# Patient Record
Sex: FEMALE | URBAN - METROPOLITAN AREA
[De-identification: names, ages, dates, MRNs, and addresses within clinical notes are randomized per-mention and may not be internally consistent; named-entity substitution may affect disease eponyms.]

---

## 2022-11-22 ENCOUNTER — HOSPITAL ENCOUNTER (OUTPATIENT)
Dept: LAB | Age: 22
Discharge: HOME | End: 2022-11-22
Payer: OTHER GOVERNMENT

## 2022-11-22 ENCOUNTER — OFFICE VISIT (OUTPATIENT)
Dept: FAMILY MEDICINE CLINIC | Age: 22
End: 2022-11-22
Payer: OTHER GOVERNMENT

## 2022-11-22 VITALS
TEMPERATURE: 98.2 F | BODY MASS INDEX: 26.98 KG/M2 | HEIGHT: 64 IN | HEART RATE: 78 BPM | OXYGEN SATURATION: 98 % | DIASTOLIC BLOOD PRESSURE: 62 MMHG | RESPIRATION RATE: 15 BRPM | SYSTOLIC BLOOD PRESSURE: 123 MMHG | WEIGHT: 158 LBS

## 2022-11-22 DIAGNOSIS — F32.A ANXIETY AND DEPRESSION: ICD-10-CM

## 2022-11-22 DIAGNOSIS — Z30.8 ENCOUNTER FOR OTHER CONTRACEPTIVE MANAGEMENT: ICD-10-CM

## 2022-11-22 DIAGNOSIS — F41.9 ANXIETY AND DEPRESSION: ICD-10-CM

## 2022-11-22 DIAGNOSIS — G43.009 MIGRAINE WITHOUT AURA AND WITHOUT STATUS MIGRAINOSUS, NOT INTRACTABLE: ICD-10-CM

## 2022-11-22 DIAGNOSIS — G43.009 MIGRAINE WITHOUT AURA AND WITHOUT STATUS MIGRAINOSUS, NOT INTRACTABLE: Primary | ICD-10-CM

## 2022-11-22 DIAGNOSIS — K30 DELAYED GASTRIC EMPTYING: ICD-10-CM

## 2022-11-22 PROBLEM — K27.9 PEPTIC ULCER: Status: ACTIVE | Noted: 2022-11-22

## 2022-11-22 LAB
ANION GAP SERPL CALC-SCNC: 5 MMOL/L (ref 3–18)
APPEARANCE UR: CLEAR
BASOPHILS # BLD: 0 K/UL (ref 0–0.1)
BASOPHILS NFR BLD: 1 % (ref 0–2)
BILIRUB UR QL: NEGATIVE
BUN SERPL-MCNC: 11 MG/DL (ref 7–18)
BUN/CREAT SERPL: 15 (ref 12–20)
CALCIUM SERPL-MCNC: 8.9 MG/DL (ref 8.5–10.1)
CHLORIDE SERPL-SCNC: 107 MMOL/L (ref 100–111)
CHOLEST SERPL-MCNC: 203 MG/DL
CO2 SERPL-SCNC: 26 MMOL/L (ref 21–32)
COLOR UR: YELLOW
CREAT SERPL-MCNC: 0.74 MG/DL (ref 0.6–1.3)
CREAT UR-MCNC: 278 MG/DL (ref 30–125)
DIFFERENTIAL METHOD BLD: ABNORMAL
EOSINOPHIL # BLD: 0.1 K/UL (ref 0–0.4)
EOSINOPHIL NFR BLD: 1 % (ref 0–5)
ERYTHROCYTE [DISTWIDTH] IN BLOOD BY AUTOMATED COUNT: 13 % (ref 11.6–14.5)
EST. AVERAGE GLUCOSE BLD GHB EST-MCNC: 111 MG/DL
GLUCOSE SERPL-MCNC: 90 MG/DL (ref 74–99)
GLUCOSE UR STRIP.AUTO-MCNC: NEGATIVE MG/DL
HBA1C MFR BLD: 5.5 % (ref 4.2–5.6)
HCT VFR BLD AUTO: 36.9 % (ref 35–45)
HDLC SERPL-MCNC: 46 MG/DL (ref 40–60)
HDLC SERPL: 4.4 {RATIO} (ref 0–5)
HGB BLD-MCNC: 11.8 G/DL (ref 12–16)
HGB UR QL STRIP: NEGATIVE
IMM GRANULOCYTES # BLD AUTO: 0 K/UL (ref 0–0.04)
IMM GRANULOCYTES NFR BLD AUTO: 0 % (ref 0–0.5)
KETONES UR QL STRIP.AUTO: NEGATIVE MG/DL
LDLC SERPL CALC-MCNC: 133.6 MG/DL (ref 0–100)
LEUKOCYTE ESTERASE UR QL STRIP.AUTO: NEGATIVE
LIPID PROFILE,FLP: ABNORMAL
LYMPHOCYTES # BLD: 1.6 K/UL (ref 0.9–3.6)
LYMPHOCYTES NFR BLD: 29 % (ref 21–52)
MCH RBC QN AUTO: 26.9 PG (ref 24–34)
MCHC RBC AUTO-ENTMCNC: 32 G/DL (ref 31–37)
MCV RBC AUTO: 84.1 FL (ref 78–100)
MICROALBUMIN UR-MCNC: 1.76 MG/DL (ref 0–3)
MICROALBUMIN/CREAT UR-RTO: 6 MG/G (ref 0–30)
MONOCYTES # BLD: 0.3 K/UL (ref 0.05–1.2)
MONOCYTES NFR BLD: 6 % (ref 3–10)
NEUTS SEG # BLD: 3.6 K/UL (ref 1.8–8)
NEUTS SEG NFR BLD: 63 % (ref 40–73)
NITRITE UR QL STRIP.AUTO: NEGATIVE
NRBC # BLD: 0 K/UL (ref 0–0.01)
NRBC BLD-RTO: 0 PER 100 WBC
PH UR STRIP: 5.5 [PH] (ref 5–8)
PLATELET # BLD AUTO: 224 K/UL (ref 135–420)
PMV BLD AUTO: 10.3 FL (ref 9.2–11.8)
POTASSIUM SERPL-SCNC: 4.2 MMOL/L (ref 3.5–5.5)
PROT UR STRIP-MCNC: NEGATIVE MG/DL
RBC # BLD AUTO: 4.39 M/UL (ref 4.2–5.3)
SODIUM SERPL-SCNC: 138 MMOL/L (ref 136–145)
SP GR UR REFRACTOMETRY: 1.03 (ref 1–1.03)
TRIGL SERPL-MCNC: 117 MG/DL (ref ?–150)
TSH SERPL DL<=0.05 MIU/L-ACNC: 2.5 UIU/ML (ref 0.36–3.74)
UROBILINOGEN UR QL STRIP.AUTO: 0.2 EU/DL (ref 0.2–1)
VLDLC SERPL CALC-MCNC: 23.4 MG/DL
WBC # BLD AUTO: 5.6 K/UL (ref 4.6–13.2)

## 2022-11-22 PROCEDURE — 82043 UR ALBUMIN QUANTITATIVE: CPT

## 2022-11-22 PROCEDURE — 99204 OFFICE O/P NEW MOD 45 MIN: CPT

## 2022-11-22 PROCEDURE — 80061 LIPID PANEL: CPT

## 2022-11-22 PROCEDURE — 84443 ASSAY THYROID STIM HORMONE: CPT

## 2022-11-22 PROCEDURE — 86803 HEPATITIS C AB TEST: CPT

## 2022-11-22 PROCEDURE — 80048 BASIC METABOLIC PNL TOTAL CA: CPT

## 2022-11-22 PROCEDURE — 36415 COLL VENOUS BLD VENIPUNCTURE: CPT

## 2022-11-22 PROCEDURE — 85025 COMPLETE CBC W/AUTO DIFF WBC: CPT

## 2022-11-22 PROCEDURE — 83036 HEMOGLOBIN GLYCOSYLATED A1C: CPT

## 2022-11-22 PROCEDURE — 81003 URINALYSIS AUTO W/O SCOPE: CPT

## 2022-11-22 RX ORDER — ACETAMINOPHEN AND CODEINE PHOSPHATE 120; 12 MG/5ML; MG/5ML
1 SOLUTION ORAL DAILY
Qty: 30 TABLET | Refills: 4 | Status: SHIPPED | OUTPATIENT
Start: 2022-11-22

## 2022-11-22 RX ORDER — PROMETHAZINE HYDROCHLORIDE 12.5 MG/1
12.5 TABLET ORAL
Qty: 30 TABLET | Refills: 3 | Status: SHIPPED | OUTPATIENT
Start: 2022-11-22

## 2022-11-22 RX ORDER — CYCLOBENZAPRINE HCL 5 MG
5 TABLET ORAL 2 TIMES DAILY
Qty: 60 TABLET | Refills: 3 | Status: SHIPPED | OUTPATIENT
Start: 2022-11-22

## 2022-11-22 RX ORDER — NORETHINDRONE ACETATE AND ETHINYL ESTRADIOL, AND FERROUS FUMARATE 1MG-20(24)
KIT ORAL
COMMUNITY
End: 2022-11-22

## 2022-11-22 RX ORDER — FLUOXETINE 10 MG/1
10 TABLET ORAL DAILY
COMMUNITY
End: 2022-11-22 | Stop reason: SDUPTHER

## 2022-11-22 RX ORDER — FLUOXETINE 10 MG/1
10 TABLET ORAL DAILY
Qty: 90 TABLET | Refills: 1 | Status: SHIPPED | OUTPATIENT
Start: 2022-11-22

## 2022-11-22 RX ORDER — ONDANSETRON 4 MG/1
TABLET, ORALLY DISINTEGRATING ORAL
COMMUNITY

## 2022-11-22 RX ORDER — SUMATRIPTAN 50 MG/1
50 TABLET, FILM COATED ORAL
Qty: 30 TABLET | Refills: 3 | Status: SHIPPED | OUTPATIENT
Start: 2022-11-22

## 2022-11-22 NOTE — PROGRESS NOTES
Oma Clancy (: 2000) is a 23 y.o. female, new patient, here for evaluation of the following chief complaint(s):  No chief complaint on file. Subjective:     HPI:    Patient presents to [facility] care. She reports that she has been suffering from migraines for about 3 years without treatment. She reports that headaches start low and increase in intensity. Headaches are usually one-sided and last about 24 hours. She denies nausea or vomiting during episodes. She reports sensitivity to light, denies loss of vision, denies known aura  She reports that stress is a trigger for migraines. She reports headaches are usually precipitated by pain and tension in shoulders and neck prior to headache. She characterizes headache as throbbing. She states that over-the-counter medications do not work and she has been treating headaches by alternating ice and heat. Frequency of headaches have been every day for the last 2 weeks. She reports increased stress over the last few weeks that she is just moved to [place] from [place]. She also reports that she has been taking Prozac for anxiety since last December. Patient was educated that Prozac and pregnancy. patient reports that she is on birth control. She also presents with concern about constipation. Past medical history includes delayed gastric emptying. She reports intermittent nausea. Last endoscopy was in 2019 and was negative. She reports stools are brown and has bowel movement every 2 to 3 days. No past medical history on file. No past surgical history on file.     ROS:    General: negative for - chills, fever, weight changes or malaise  HEENT: no sore throat, nasal congestion, vision problems or ear problems  Respiratory: no cough, shortness of breath, or wheezing  Cardiovascular: no chest pain, palpitations, or dyspnea on exertion  Gastrointestinal: no abdominal pain, N/V, change in bowel habits  Musculoskeletal: no back pain or joint pain  Neurological: no headache or dizziness  Endo:  No polyuria or polydipsia  : no urinary  Psychological: negative for - anxiety, depression, sleeps issues       Objective:     Blood pressure 123/62, pulse 78, temperature 98.2 °F (36.8 °C), temperature source Temporal, resp. rate 15, height 5' 4\" (1.626 m), weight 158 lb (71.7 kg), last menstrual period 10/19/2022, SpO2 98 %. Physical Exam:  Patient appears well nourished, alert, oriented x 3, in no distress. ENT normal.  Neck supple. No adenopathy or thyromegaly. DENICE. Lungs are clear to auscultation bilaterally. Breathing is unlabored and regular rhythm. No wheezes, no rhonchi. Cardiovascular, S1 and S2 normal, no murmurs, regular rate and rhythm. Chest wall negative for tenderness  Abdomen is soft without tenderness, no guarding  /Anorectal, deferred. Muscleskeletal, no swelling, no tenderness, no injury. Extremities show no edema bilaterally. Neurological is normal without focal findings. Skin: no concerning lesions. Psych: normal affect. Mood good. Oriented x 3. Assessment & Plan:      Diagnoses and all orders for this visit:    1. Migraine without aura and without status migrainosus, not intractable  -     SUMAtriptan (IMITREX) 50 mg tablet; Take 1 Tablet by mouth daily as needed for Migraine. Indications: a migraine headache  -     promethazine (PHENERGAN) 12.5 mg tablet; Take 1 Tablet by mouth every six (6) hours as needed for Nausea. Indications: migraine and nausea  -     cyclobenzaprine (FLEXERIL) 5 mg tablet; Take 1 Tablet by mouth two (2) times a day. Indications: migraine  -     LIPID PANEL; Future  -     MICROALBUMIN, UR, RAND W/ MICROALB/CREAT RATIO; Future  -     URINALYSIS W/ RFLX MICROSCOPIC; Future  -     HEMOGLOBIN A1C WITH EAG; Future  -     TSH 3RD GENERATION; Future  -     METABOLIC PANEL, BASIC; Future  -     CBC WITH AUTOMATED DIFF; Future  -     HEPATITIS C AB; Future    2.  Anxiety and depression  - LIPID PANEL; Future  -     MICROALBUMIN, UR, RAND W/ MICROALB/CREAT RATIO; Future  -     URINALYSIS W/ RFLX MICROSCOPIC; Future  -     HEMOGLOBIN A1C WITH EAG; Future  -     TSH 3RD GENERATION; Future  -     METABOLIC PANEL, BASIC; Future  -     CBC WITH AUTOMATED DIFF; Future  -     HEPATITIS C AB; Future    3. Encounter for other contraceptive management  -     norethindrone (MICRONOR) 0.35 mg tab; Take 1 Tablet by mouth daily. 4. Delayed gastric emptying     Patient reports that she eats low fiber diet. Patient was encouraged to get over-the-counter Metamucil twice daily, increase water intake, and increase activity. Other orders  -     FLUoxetine (PROzac) 10 mg tablet; Take 1 Tablet by mouth daily. Follow-up and Dispositions    Return in about 1 month (around 12/22/2022), or physical.            On this date 11/22/2022 I have spent 30 minutes reviewing previous notes, test results and face to face with the patient discussing the diagnosis and importance of compliance with the treatment plan as well as documenting on the day of the visit. An electronic signature was used to authenticate this note.   -- RAHEEL Meek

## 2022-11-23 LAB
HCV AB SER IA-ACNC: 0.03 INDEX
HCV AB SERPL QL IA: NEGATIVE
HCV COMMENT,HCGAC: NORMAL

## 2022-12-30 ENCOUNTER — OFFICE VISIT (OUTPATIENT)
Dept: FAMILY MEDICINE CLINIC | Age: 22
End: 2022-12-30
Payer: OTHER GOVERNMENT

## 2022-12-30 VITALS
HEIGHT: 64 IN | RESPIRATION RATE: 16 BRPM | OXYGEN SATURATION: 98 % | WEIGHT: 164 LBS | BODY MASS INDEX: 28 KG/M2 | SYSTOLIC BLOOD PRESSURE: 120 MMHG | HEART RATE: 86 BPM | DIASTOLIC BLOOD PRESSURE: 85 MMHG | TEMPERATURE: 98.3 F

## 2022-12-30 DIAGNOSIS — K21.9 GASTROESOPHAGEAL REFLUX DISEASE WITHOUT ESOPHAGITIS: ICD-10-CM

## 2022-12-30 DIAGNOSIS — G43.009 MIGRAINE WITHOUT AURA AND WITHOUT STATUS MIGRAINOSUS, NOT INTRACTABLE: ICD-10-CM

## 2022-12-30 DIAGNOSIS — H55.00 NYSTAGMUS: ICD-10-CM

## 2022-12-30 DIAGNOSIS — K30 DELAYED GASTRIC EMPTYING: ICD-10-CM

## 2022-12-30 DIAGNOSIS — I49.9 CARDIAC ARRHYTHMIA, UNSPECIFIED CARDIAC ARRHYTHMIA TYPE: ICD-10-CM

## 2022-12-30 DIAGNOSIS — F41.9 ANXIETY AND DEPRESSION: Primary | ICD-10-CM

## 2022-12-30 DIAGNOSIS — F32.A ANXIETY AND DEPRESSION: Primary | ICD-10-CM

## 2022-12-30 PROBLEM — E78.2 MIXED HYPERLIPIDEMIA: Status: ACTIVE | Noted: 2022-12-30

## 2022-12-30 PROCEDURE — 99214 OFFICE O/P EST MOD 30 MIN: CPT

## 2022-12-30 RX ORDER — FLUOXETINE 10 MG/1
10 TABLET ORAL DAILY
Qty: 90 TABLET | Refills: 1 | Status: SHIPPED | OUTPATIENT
Start: 2022-12-30

## 2022-12-30 RX ORDER — FAMOTIDINE 20 MG/1
20 TABLET, FILM COATED ORAL 2 TIMES DAILY
Qty: 90 TABLET | Refills: 0 | Status: SHIPPED | OUTPATIENT
Start: 2022-12-30 | End: 2022-12-31

## 2022-12-30 NOTE — PROGRESS NOTES
Tuan Isbell (: 2000) is a 23 y.o. female, established patient, here for evaluation of the following chief complaint(s):  Physical (Med refills)         Subjective:     HPI:      Patient presents for annual physical and medication refills. She reports that she is still having headaches every other day. She reports trying Imitrex without resolve. She says that Imitrex has given her increased nausea. No past medical history on file. No past surgical history on file. ROS:    General: negative for - chills, fever, weight changes or malaise  HEENT: no sore throat, nasal congestion, vision problems or ear problems  Respiratory: no cough, shortness of breath, or wheezing  Cardiovascular: no chest pain, palpitations, or dyspnea on exertion  Gastrointestinal: no abdominal pain, N/V, change in bowel habits  Musculoskeletal: no back pain or joint pain  Neurological: no headache or dizziness  Endo:  No polyuria or polydipsia  : no urinary  Psychological: negative for - anxiety, depression, sleeps issues       Objective:     Blood pressure 120/85, pulse 86, temperature 98.3 °F (36.8 °C), temperature source Temporal, resp. rate 16, height 5' 4\" (1.626 m), weight 164 lb (74.4 kg), SpO2 98 %. Physical Exam:  Patient appears well nourished, alert, oriented x 3, in no distress. ENT normal.  Neck supple. No adenopathy or thyromegaly. DENICE. Nystagmus to right-sided gaze. Lungs are clear to auscultation bilaterally. Breathing is unlabored and regular rhythm. No wheezes, no rhonchi. Cardiovascular, S1 and S2 normal, no murmurs, regular rate and rhythm. Chest wall negative for tenderness  Abdomen is soft without tenderness, no guarding  /Anorectal, deferred. Muscleskeletal, no swelling, no tenderness, no injury. Extremities show no edema bilaterally. Neurological is normal without focal findings. Skin: no concerning lesions. Psych: normal affect. Mood good. Oriented x 3.       Assessment & Plan:      Diagnoses and all orders for this visit:    1. Anxiety and depression  -     FLUoxetine (PROzac) 10 mg tablet; Take 1 Tablet by mouth daily. 2. Delayed gastric emptying  -     REFERRAL TO GASTROENTEROLOGY    3. Gastroesophageal reflux disease without esophagitis  -     REFERRAL TO GASTROENTEROLOGY  -     H. PYLORI BREATH TEST; Future    4. Migraine without aura and without status migrainosus, not intractable  -     rimegepant (NURTEC) 75 mg disintegrating tablet; Take 1 Tablet by mouth daily as needed for Migraine. Indications: a migraine headache, migraine prevention  -     REFERRAL TO NEUROLOGY    5. Nystagmus  Assessment & Plan: With right sided gaze    Orders:  -     REFERRAL TO ENT-OTOLARYNGOLOGY  -     REFERRAL TO NEUROLOGY    6. Cardiac arrhythmia, unspecified cardiac arrhythmia type  -     EKG, 12 LEAD, INITIAL; Future                On this date 12/30/2022 I have spent 30 minutes reviewing previous notes, test results and face to face with the patient discussing the diagnosis and importance of compliance with the treatment plan as well as documenting on the day of the visit. An electronic signature was used to authenticate this note.   -- RAHEEL Garcia

## 2022-12-30 NOTE — PROGRESS NOTES
Leanna First presents today for   Chief Complaint   Patient presents with    Physical     Med refills       Is someone accompanying this pt? no    Is the patient using any DME equipment during OV? no    Depression Screening:  3 most recent PHQ Screens 12/30/2022   Little interest or pleasure in doing things Not at all   Feeling down, depressed, irritable, or hopeless Not at all   Total Score PHQ 2 0       Learning Assessment:  Learning Assessment 11/22/2022   PRIMARY LEARNER Patient   HIGHEST LEVEL OF EDUCATION - PRIMARY LEARNER  GRADUATED HIGH SCHOOL OR GED   BARRIERS PRIMARY LEARNER NONE   CO-LEARNER CAREGIVER No   PRIMARY LANGUAGE ENGLISH   LEARNER PREFERENCE PRIMARY LISTENING   ANSWERED BY patient   RELATIONSHIP SELF       Abuse Screening:  Abuse Screening Questionnaire 11/22/2022   Do you ever feel afraid of your partner? N   Are you in a relationship with someone who physically or mentally threatens you? N   Is it safe for you to go home? Y       Fall Risk  No flowsheet data found. Health Maintenance reviewed and discussed and ordered per Provider. Health Maintenance Due   Topic Date Due    COVID-19 Vaccine (1) Never done    DTaP/Tdap/Td series (1 - Tdap) Never done   . 1. \"Have you been to the ER, urgent care clinic since your last visit? Hospitalized since your last visit? \" No    2. \"Have you seen or consulted any other health care providers outside of the [address] [place] [address] since your last visit? \" No     3. For patients over 45: Has the patient had a colonoscopy? NA - based on age     If the patient is female:    4. For patients over 36: Has the patient had a mammogram? NA - based on age    11. For patients over 21: Has the patient had a pap smear?  NA - based on age

## 2022-12-31 RX ORDER — FAMOTIDINE 20 MG/1
TABLET, FILM COATED ORAL
Qty: 180 TABLET | Refills: 1 | Status: SHIPPED | OUTPATIENT
Start: 2022-12-31

## 2023-01-17 DIAGNOSIS — F32.A ANXIETY AND DEPRESSION: ICD-10-CM

## 2023-01-17 DIAGNOSIS — F41.9 ANXIETY AND DEPRESSION: ICD-10-CM

## 2023-01-17 RX ORDER — FLUOXETINE 10 MG/1
10 TABLET ORAL DAILY
Qty: 90 TABLET | Refills: 1 | Status: SHIPPED | OUTPATIENT
Start: 2023-01-17

## 2023-01-17 NOTE — TELEPHONE ENCOUNTER
PT states her past pharmacy kept saying they never received prescription for prozac. PT is now requesting if medication can be sent to a different pharmacy as she has been off it for a while and keeps having issues. This patient contacted the office for the following prescriptions to be refilled:    Medication requested :   Requested Prescriptions     Pending Prescriptions Disp Refills    FLUoxetine (PROzac) 10 mg tablet 90 Tablet 1     Sig: Take 1 Tablet by mouth daily. PCP: TRACY Boyle  LOV: 12/30/2022  NOV DMA: 2/24/2023  FUTURE APPT:   Future Appointments   Date Time Provider Chetan Garcia   2/24/2023  3:00 PM Halle Coello, TRACY ADAMS BS [facility]B         Thank you.
Statement Selected

## 2023-03-03 ENCOUNTER — OFFICE VISIT (OUTPATIENT)
Facility: CLINIC | Age: 23
End: 2023-03-03
Payer: OTHER GOVERNMENT

## 2023-03-03 VITALS
WEIGHT: 166 LBS | RESPIRATION RATE: 15 BRPM | HEART RATE: 80 BPM | SYSTOLIC BLOOD PRESSURE: 103 MMHG | TEMPERATURE: 98.2 F | DIASTOLIC BLOOD PRESSURE: 65 MMHG | HEIGHT: 64 IN | BODY MASS INDEX: 28.34 KG/M2

## 2023-03-03 DIAGNOSIS — G43.009 MIGRAINE WITHOUT AURA AND WITHOUT STATUS MIGRAINOSUS, NOT INTRACTABLE: Primary | ICD-10-CM

## 2023-03-03 DIAGNOSIS — F32.A ANXIETY AND DEPRESSION: ICD-10-CM

## 2023-03-03 DIAGNOSIS — F41.9 ANXIETY AND DEPRESSION: ICD-10-CM

## 2023-03-03 PROCEDURE — 99212 OFFICE O/P EST SF 10 MIN: CPT

## 2023-03-03 NOTE — PROGRESS NOTES
Keily Shannon is a 23 y.o. presents today for No chief complaint on file. Is someone accompanying this pt? No    Is the patient using any DME equipment during OV? No    Depression Screening:   PHQ-9 Questionaire 12/30/2022 11/22/2022 11/22/2022 11/22/2022   Little interest or pleasure in doing things 0 0 0 0   Feeling down, depressed, or hopeless 0 0 0 0   PHQ-9 Total Score 0 0 0 0       Abuse Screening:  No flowsheet data found. Learning Assessment:  No question data found. Fall Risk:  No flowsheet data found. Coordination of Care:   1. \"Have you been to the ER, urgent care clinic since your last visit? Hospitalized since your last visit? \" No     2. \"Have you seen or consulted any other health care providers outside of the [address] [place] [address] since your last visit? \" No    3. For patients aged 39-70: Has the patient had a colonoscopy / FIT/ Cologuard? NA    If the patient is female:    4. For patients aged 41-77: Has the patient had a mammogram within the past 2 years? NA    5. For patients aged 21-65: Has the patient had a pap smear? NA    Health Maintenance: reviewed and discussed and ordered per Provider.     Health Maintenance Due   Topic Date Due    COVID-19 Vaccine (1) Never done    Varicella vaccine (1 of 2 - 2-dose childhood series) Never done    HIV screen  Never done    Chlamydia/GC screen  Never done    DTaP/Tdap/Td vaccine (1 - Tdap) Never done    Flu vaccine (1) Never done

## 2023-03-03 NOTE — PROGRESS NOTES
Christal Grimaldo (: 2000) is a 23 y.o. female, established patient, here for evaluation of the following chief complaint(s):  Migraine             Subjective:     HPI:  Patient presents for follow up of migraines. She reports that Imitrex and Fioricet have not been effective in reducing frequency or reducing symptoms associated with migraines. She was previously given samples of Nurtec which she stated helped reduce the severity of the headaches. She denies any changes and quality of migraines. No past medical history on file. No past surgical history on file. ROS:    General: negative for - chills, fever, weight changes or malaise  HEENT: no sore throat, nasal congestion, vision problems or ear problems  Respiratory: no cough, shortness of breath, or wheezing  Cardiovascular: no chest pain, palpitations, or dyspnea on exertion  Gastrointestinal: no abdominal pain, N/V, change in bowel habits  Musculoskeletal: no back pain or joint pain  Neurological: no headache or dizziness  Endo:  No polyuria or polydipsia  : no urinary  Psychological: negative for - anxiety, depression, sleeps issues       Objective:     Vitals:    23 1458   BP: 103/65   Pulse: 80   Resp: 15   Temp: 98.2 °F (36.8 °C)        Physical Exam:  Patient appears well nourished, alert, oriented x 3, in no distress. ENT normal.  Neck supple. No adenopathy or thyromegaly. ALMA. Lungs are clear to auscultation bilaterally. Breathing is unlabored and regular rhythm. No wheezes, no rhonchi. Cardiovascular, S1 and S2 normal, no murmurs, regular rate and rhythm. Chest wall negative for tenderness  Abdomen is soft without tenderness, no guarding  /Anorectal, deferred. Muscleskeletal, no swelling, no tenderness, no injury. Extremities show no edema bilaterally. Neurological is normal without focal findings. Skin: no concerning lesions. Psych: normal affect. Mood good. Oriented x 3.       Assessment & Plan:      Adelita Basilio was seen today for migraine. Diagnoses and all orders for this visit:    Migraine without aura and without status migrainosus, not intractable    Anxiety and depression  Well-controlled on Zoloft. No changes to current medication regimen. Return in about 6 months (around 9/3/2023) for anxiety and depression, migraine. .     On this date 03/03/2023  I have spent 15 minutes reviewing previous notes, test results and face to face with the patient discussing the diagnosis and importance of compliance with the treatment plan as well as documenting on the day of the visit. An electronic signature was used to authenticate this note.   -- PHOENIX Givens

## 2023-05-25 ENCOUNTER — OFFICE VISIT (OUTPATIENT)
Facility: CLINIC | Age: 23
End: 2023-05-25
Payer: OTHER GOVERNMENT

## 2023-05-25 VITALS
RESPIRATION RATE: 15 BRPM | HEART RATE: 77 BPM | SYSTOLIC BLOOD PRESSURE: 106 MMHG | WEIGHT: 173 LBS | HEIGHT: 64 IN | DIASTOLIC BLOOD PRESSURE: 70 MMHG | TEMPERATURE: 98.2 F | BODY MASS INDEX: 29.53 KG/M2 | OXYGEN SATURATION: 98 %

## 2023-05-25 DIAGNOSIS — R11.0 NAUSEA: ICD-10-CM

## 2023-05-25 DIAGNOSIS — H55.00 NYSTAGMUS: ICD-10-CM

## 2023-05-25 DIAGNOSIS — R42 DIZZINESS: Primary | ICD-10-CM

## 2023-05-25 DIAGNOSIS — G43.009 MIGRAINE WITHOUT AURA, NOT INTRACTABLE, WITHOUT STATUS MIGRAINOSUS: ICD-10-CM

## 2023-05-25 PROCEDURE — 93000 ELECTROCARDIOGRAM COMPLETE: CPT

## 2023-05-25 PROCEDURE — 99214 OFFICE O/P EST MOD 30 MIN: CPT

## 2023-05-25 RX ORDER — PROMETHAZINE HYDROCHLORIDE 12.5 MG/1
12.5 TABLET ORAL EVERY 6 HOURS PRN
Qty: 90 TABLET | Refills: 1 | Status: SHIPPED | OUTPATIENT
Start: 2023-05-25

## 2023-05-25 RX ORDER — MECLIZINE HYDROCHLORIDE 25 MG/1
25 TABLET ORAL 3 TIMES DAILY PRN
Qty: 15 TABLET | Refills: 0 | Status: SHIPPED | OUTPATIENT
Start: 2023-05-25 | End: 2023-06-04

## 2023-05-25 RX ORDER — ONDANSETRON 4 MG/1
TABLET, ORALLY DISINTEGRATING ORAL
Qty: 90 TABLET | Refills: 1 | Status: SHIPPED | OUTPATIENT
Start: 2023-05-25

## 2023-05-25 SDOH — ECONOMIC STABILITY: INCOME INSECURITY: HOW HARD IS IT FOR YOU TO PAY FOR THE VERY BASICS LIKE FOOD, HOUSING, MEDICAL CARE, AND HEATING?: NOT HARD AT ALL

## 2023-05-25 SDOH — ECONOMIC STABILITY: FOOD INSECURITY: WITHIN THE PAST 12 MONTHS, THE FOOD YOU BOUGHT JUST DIDN'T LAST AND YOU DIDN'T HAVE MONEY TO GET MORE.: NEVER TRUE

## 2023-05-25 SDOH — ECONOMIC STABILITY: HOUSING INSECURITY
IN THE LAST 12 MONTHS, WAS THERE A TIME WHEN YOU DID NOT HAVE A STEADY PLACE TO SLEEP OR SLEPT IN A SHELTER (INCLUDING NOW)?: NO

## 2023-05-25 SDOH — ECONOMIC STABILITY: FOOD INSECURITY: WITHIN THE PAST 12 MONTHS, YOU WORRIED THAT YOUR FOOD WOULD RUN OUT BEFORE YOU GOT MONEY TO BUY MORE.: NEVER TRUE

## 2023-05-25 ASSESSMENT — PATIENT HEALTH QUESTIONNAIRE - PHQ9
SUM OF ALL RESPONSES TO PHQ QUESTIONS 1-9: 0
SUM OF ALL RESPONSES TO PHQ QUESTIONS 1-9: 0
SUM OF ALL RESPONSES TO PHQ9 QUESTIONS 1 & 2: 0
SUM OF ALL RESPONSES TO PHQ QUESTIONS 1-9: 0
SUM OF ALL RESPONSES TO PHQ QUESTIONS 1-9: 0
2. FEELING DOWN, DEPRESSED OR HOPELESS: 0
1. LITTLE INTEREST OR PLEASURE IN DOING THINGS: 0

## 2023-08-31 ENCOUNTER — TELEPHONE (OUTPATIENT)
Facility: CLINIC | Age: 23
End: 2023-08-31

## 2023-08-31 NOTE — TELEPHONE ENCOUNTER
Patient called requesting a refill on the following:      FLUoxetine HCl, PMDD, 10 MG TABS      Please advise    Thank you

## 2023-09-01 DIAGNOSIS — F32.A ANXIETY AND DEPRESSION: Primary | ICD-10-CM

## 2023-09-01 DIAGNOSIS — F41.9 ANXIETY AND DEPRESSION: Primary | ICD-10-CM

## 2023-09-01 RX ORDER — FLUOXETINE 10 MG/1
10 TABLET ORAL DAILY
Qty: 90 TABLET | Refills: 1 | Status: SHIPPED | OUTPATIENT
Start: 2023-09-01

## 2023-09-26 ENCOUNTER — TELEPHONE (OUTPATIENT)
Facility: CLINIC | Age: 23
End: 2023-09-26

## 2023-09-26 NOTE — TELEPHONE ENCOUNTER
Ramez Hand called from [place] states that a order was received for CT/MRI of the brain. Ramez Hand states the radiologist feels that the pt does not need to have both done, requesting a return call to have a new order submitted. # [phone].

## 2023-10-02 DIAGNOSIS — H55.00 NYSTAGMUS: ICD-10-CM

## 2023-10-02 DIAGNOSIS — G43.009 MIGRAINE WITHOUT AURA, NOT INTRACTABLE, WITHOUT STATUS MIGRAINOSUS: Primary | ICD-10-CM

## 2023-10-02 NOTE — TELEPHONE ENCOUNTER
Got a hold of Richa, transferred call to [facility].

## 2024-05-19 DIAGNOSIS — F32.A ANXIETY AND DEPRESSION: ICD-10-CM

## 2024-05-19 DIAGNOSIS — F41.9 ANXIETY AND DEPRESSION: ICD-10-CM

## 2024-05-20 NOTE — TELEPHONE ENCOUNTER
Medication(s) requesting:   Requested Prescriptions     Pending Prescriptions Disp Refills    FLUoxetine HCl, PMDD, 10 MG TABS [Pharmacy Med Name: FLUOXETINE 10MG (PMDD) TABLETS] 90 tablet 1     Sig: TAKE 1 TABLET BY MOUTH DAILY       Last office visit:  5/25/2023  Next office visit DMA: Visit date not found  FUTURE APPT: No future appointments.

## 2024-05-21 RX ORDER — FLUOXETINE 10 MG/1
1 TABLET ORAL DAILY
Qty: 90 TABLET | Refills: 1 | OUTPATIENT
Start: 2024-05-21

## 2024-06-01 DIAGNOSIS — F32.A ANXIETY AND DEPRESSION: ICD-10-CM

## 2024-06-01 DIAGNOSIS — F41.9 ANXIETY AND DEPRESSION: ICD-10-CM

## 2024-06-03 RX ORDER — FLUOXETINE 10 MG/1
1 TABLET ORAL DAILY
Qty: 90 TABLET | Refills: 0 | Status: SHIPPED | OUTPATIENT
Start: 2024-06-03

## 2024-06-03 NOTE — TELEPHONE ENCOUNTER
Medication(s) requesting:   Requested Prescriptions     Pending Prescriptions Disp Refills    FLUoxetine HCl, PMDD, 10 MG TABS [Pharmacy Med Name: FLUOXETINE 10MG (PMDD) TABLETS] 90 tablet 1     Sig: TAKE 1 TABLET BY MOUTH DAILY       Last office visit:  5/25/23  Next office visit DMA: Visit date not found  FUTURE APPT: No future appointments.      Lab Results   Component Value Date     11/22/2022    K 4.2 11/22/2022     11/22/2022    CO2 26 11/22/2022    BUN 11 11/22/2022    CREATININE 0.74 11/22/2022    GLUCOSE 90 11/22/2022    CALCIUM 8.9 11/22/2022    LABGLOM >60 11/22/2022

## 2024-07-08 ENCOUNTER — TELEMEDICINE (OUTPATIENT)
Facility: CLINIC | Age: 24
End: 2024-07-08

## 2024-07-08 DIAGNOSIS — Z11.8 SCREENING FOR CHLAMYDIAL DISEASE: Primary | ICD-10-CM

## 2024-07-08 DIAGNOSIS — E55.9 VITAMIN D DEFICIENCY: ICD-10-CM

## 2024-07-08 DIAGNOSIS — G43.009 MIGRAINE WITHOUT AURA AND WITHOUT STATUS MIGRAINOSUS, NOT INTRACTABLE: ICD-10-CM

## 2024-07-08 DIAGNOSIS — F41.9 ANXIETY AND DEPRESSION: ICD-10-CM

## 2024-07-08 DIAGNOSIS — Z01.84 IMMUNITY STATUS TESTING: ICD-10-CM

## 2024-07-08 DIAGNOSIS — Z13.220 SCREENING FOR LIPID DISORDERS: ICD-10-CM

## 2024-07-08 DIAGNOSIS — F32.A ANXIETY AND DEPRESSION: ICD-10-CM

## 2024-07-08 DIAGNOSIS — Z13.1 SCREENING FOR DIABETES MELLITUS: ICD-10-CM

## 2024-07-08 DIAGNOSIS — Z11.4 SCREENING FOR HUMAN IMMUNODEFICIENCY VIRUS WITHOUT PRESENCE OF RISK FACTORS: ICD-10-CM

## 2024-07-08 PROCEDURE — 99214 OFFICE O/P EST MOD 30 MIN: CPT

## 2024-07-08 RX ORDER — FLUOXETINE 10 MG/1
1 TABLET ORAL DAILY
Qty: 90 TABLET | Refills: 0 | Status: SHIPPED | OUTPATIENT
Start: 2024-07-08

## 2024-07-08 SDOH — ECONOMIC STABILITY: FOOD INSECURITY: WITHIN THE PAST 12 MONTHS, YOU WORRIED THAT YOUR FOOD WOULD RUN OUT BEFORE YOU GOT MONEY TO BUY MORE.: NEVER TRUE

## 2024-07-08 SDOH — ECONOMIC STABILITY: FOOD INSECURITY: WITHIN THE PAST 12 MONTHS, THE FOOD YOU BOUGHT JUST DIDN'T LAST AND YOU DIDN'T HAVE MONEY TO GET MORE.: NEVER TRUE

## 2024-07-08 SDOH — ECONOMIC STABILITY: INCOME INSECURITY: HOW HARD IS IT FOR YOU TO PAY FOR THE VERY BASICS LIKE FOOD, HOUSING, MEDICAL CARE, AND HEATING?: NOT HARD AT ALL

## 2024-07-08 ASSESSMENT — ENCOUNTER SYMPTOMS
NAUSEA: 0
COUGH: 0
ABDOMINAL PAIN: 0
SHORTNESS OF BREATH: 0
DIARRHEA: 0
CONSTIPATION: 0
EYES NEGATIVE: 1
VOMITING: 0
SORE THROAT: 0
TROUBLE SWALLOWING: 0
CHEST TIGHTNESS: 0
BLOOD IN STOOL: 0
WHEEZING: 0

## 2024-07-08 ASSESSMENT — PATIENT HEALTH QUESTIONNAIRE - PHQ9
3. TROUBLE FALLING OR STAYING ASLEEP: NOT AT ALL
9. THOUGHTS THAT YOU WOULD BE BETTER OFF DEAD, OR OF HURTING YOURSELF: NOT AT ALL
SUM OF ALL RESPONSES TO PHQ QUESTIONS 1-9: 0
5. POOR APPETITE OR OVEREATING: NOT AT ALL
SUM OF ALL RESPONSES TO PHQ QUESTIONS 1-9: 0
7. TROUBLE CONCENTRATING ON THINGS, SUCH AS READING THE NEWSPAPER OR WATCHING TELEVISION: NOT AT ALL
6. FEELING BAD ABOUT YOURSELF - OR THAT YOU ARE A FAILURE OR HAVE LET YOURSELF OR YOUR FAMILY DOWN: NOT AT ALL
4. FEELING TIRED OR HAVING LITTLE ENERGY: NOT AT ALL
SUM OF ALL RESPONSES TO PHQ QUESTIONS 1-9: 0
2. FEELING DOWN, DEPRESSED OR HOPELESS: NOT AT ALL
8. MOVING OR SPEAKING SO SLOWLY THAT OTHER PEOPLE COULD HAVE NOTICED. OR THE OPPOSITE, BEING SO FIGETY OR RESTLESS THAT YOU HAVE BEEN MOVING AROUND A LOT MORE THAN USUAL: NOT AT ALL
SUM OF ALL RESPONSES TO PHQ QUESTIONS 1-9: 0
10. IF YOU CHECKED OFF ANY PROBLEMS, HOW DIFFICULT HAVE THESE PROBLEMS MADE IT FOR YOU TO DO YOUR WORK, TAKE CARE OF THINGS AT HOME, OR GET ALONG WITH OTHER PEOPLE: NOT DIFFICULT AT ALL

## 2024-07-08 NOTE — PROGRESS NOTES
Priscila Carter (: 2000) is a 24 y.o. female, established  patient, here for evaluation of the following:      Subjective:     Primary historian: patient    Follow-up       She is doing well, denies acute complaints.  Would like refills of her medication of fluoxetine.  She has been taking same medication for several years.        No past medical history on file.     No past surgical history on file.     Current Outpatient Medications   Medication Sig Dispense Refill    FLUoxetine HCl, PMDD, 10 MG TABS Take 1 tablet by mouth daily 90 tablet 0    ondansetron (ZOFRAN-ODT) 4 MG disintegrating tablet ondansetron 4 mg disintegrating tablet  DISSOLVE 1 TABLET ON THE TONGUE EVERY 12 HOURS AS NEEDED 90 tablet 1    promethazine (PHENERGAN) 12.5 MG tablet Take 1 tablet by mouth every 6 hours as needed for Nausea 90 tablet 1    Rimegepant Sulfate 75 MG TBDP Take 75 mg by mouth daily as needed       No current facility-administered medications for this visit.       ROS:    Review of Systems   Constitutional:  Negative for chills, fatigue and fever.   HENT:  Negative for ear pain, hearing loss, sore throat and trouble swallowing.    Eyes: Negative.    Respiratory:  Negative for cough, chest tightness, shortness of breath and wheezing.    Cardiovascular:  Negative for chest pain, palpitations and leg swelling.   Gastrointestinal:  Negative for abdominal pain, blood in stool, constipation, diarrhea, nausea and vomiting.   Endocrine: Negative for polydipsia, polyphagia and polyuria.   Genitourinary:  Negative for flank pain, hematuria, pelvic pain, vaginal bleeding, vaginal discharge and vaginal pain.   Skin: Negative.    Neurological:  Negative for dizziness, syncope, weakness and numbness.   Psychiatric/Behavioral:  Negative for confusion, decreased concentration, self-injury, sleep disturbance and suicidal ideas. The patient is not nervous/anxious.           Objective:          No data to display                  Physical

## 2024-11-26 ENCOUNTER — HOSPITAL ENCOUNTER (OUTPATIENT)
Facility: HOSPITAL | Age: 24
Setting detail: SPECIMEN
Discharge: HOME | End: 2024-11-29
Payer: OTHER GOVERNMENT

## 2024-11-26 ENCOUNTER — LAB (OUTPATIENT)
Facility: CLINIC | Age: 24
End: 2024-11-26

## 2024-11-26 DIAGNOSIS — Z11.4 SCREENING FOR HUMAN IMMUNODEFICIENCY VIRUS WITHOUT PRESENCE OF RISK FACTORS: ICD-10-CM

## 2024-11-26 DIAGNOSIS — Z01.84 IMMUNITY STATUS TESTING: ICD-10-CM

## 2024-11-26 DIAGNOSIS — F32.A ANXIETY AND DEPRESSION: ICD-10-CM

## 2024-11-26 DIAGNOSIS — F41.9 ANXIETY AND DEPRESSION: ICD-10-CM

## 2024-11-26 DIAGNOSIS — Z13.220 SCREENING FOR LIPID DISORDERS: ICD-10-CM

## 2024-11-26 DIAGNOSIS — G43.009 MIGRAINE WITHOUT AURA AND WITHOUT STATUS MIGRAINOSUS, NOT INTRACTABLE: ICD-10-CM

## 2024-11-26 DIAGNOSIS — Z13.1 SCREENING FOR DIABETES MELLITUS: ICD-10-CM

## 2024-11-26 DIAGNOSIS — E55.9 VITAMIN D DEFICIENCY: ICD-10-CM

## 2024-11-26 DIAGNOSIS — Z11.8 SCREENING FOR CHLAMYDIAL DISEASE: ICD-10-CM

## 2024-11-26 LAB
25(OH)D3 SERPL-MCNC: 17.5 NG/ML (ref 30–100)
ALBUMIN SERPL-MCNC: 3.8 G/DL (ref 3.4–5)
ALBUMIN/GLOB SERPL: 1 (ref 0.8–1.7)
ALP SERPL-CCNC: 85 U/L (ref 45–117)
ALT SERPL-CCNC: 20 U/L (ref 13–56)
ANION GAP SERPL CALC-SCNC: 6 MMOL/L (ref 3–18)
AST SERPL-CCNC: 16 U/L (ref 10–38)
BASOPHILS # BLD: 0.1 K/UL (ref 0–0.1)
BASOPHILS NFR BLD: 1 % (ref 0–2)
BILIRUB SERPL-MCNC: 1 MG/DL (ref 0.2–1)
BUN SERPL-MCNC: 8 MG/DL (ref 7–18)
BUN/CREAT SERPL: 11 (ref 12–20)
CALCIUM SERPL-MCNC: 9.2 MG/DL (ref 8.5–10.1)
CHLORIDE SERPL-SCNC: 108 MMOL/L (ref 100–111)
CHOLEST SERPL-MCNC: 182 MG/DL
CO2 SERPL-SCNC: 24 MMOL/L (ref 21–32)
CREAT SERPL-MCNC: 0.7 MG/DL (ref 0.6–1.3)
DIFFERENTIAL METHOD BLD: ABNORMAL
EOSINOPHIL # BLD: 0 K/UL (ref 0–0.4)
EOSINOPHIL NFR BLD: 1 % (ref 0–5)
ERYTHROCYTE [DISTWIDTH] IN BLOOD BY AUTOMATED COUNT: 14.5 % (ref 11.6–14.5)
EST. AVERAGE GLUCOSE BLD GHB EST-MCNC: 111 MG/DL
GLOBULIN SER CALC-MCNC: 3.7 G/DL (ref 2–4)
GLUCOSE SERPL-MCNC: 81 MG/DL (ref 74–99)
HBA1C MFR BLD: 5.5 % (ref 4.2–5.6)
HCT VFR BLD AUTO: 35.3 % (ref 35–45)
HDLC SERPL-MCNC: 46 MG/DL (ref 40–60)
HDLC SERPL: 4 (ref 0–5)
HGB BLD-MCNC: 10.9 G/DL (ref 12–16)
IMM GRANULOCYTES # BLD AUTO: 0 K/UL (ref 0–0.04)
IMM GRANULOCYTES NFR BLD AUTO: 0 % (ref 0–0.5)
LDLC SERPL CALC-MCNC: 96.8 MG/DL (ref 0–100)
LIPID PANEL: ABNORMAL
LYMPHOCYTES # BLD: 1.8 K/UL (ref 0.9–3.6)
LYMPHOCYTES NFR BLD: 24 % (ref 21–52)
MCH RBC QN AUTO: 25.6 PG (ref 24–34)
MCHC RBC AUTO-ENTMCNC: 30.9 G/DL (ref 31–37)
MCV RBC AUTO: 82.9 FL (ref 78–100)
MONOCYTES # BLD: 0.4 K/UL (ref 0.05–1.2)
MONOCYTES NFR BLD: 6 % (ref 3–10)
NEUTS SEG # BLD: 5.3 K/UL (ref 1.8–8)
NEUTS SEG NFR BLD: 69 % (ref 40–73)
NRBC # BLD: 0 K/UL (ref 0–0.01)
NRBC BLD-RTO: 0 PER 100 WBC
PLATELET # BLD AUTO: 229 K/UL (ref 135–420)
PMV BLD AUTO: 10 FL (ref 9.2–11.8)
POTASSIUM SERPL-SCNC: 4.1 MMOL/L (ref 3.5–5.5)
PROT SERPL-MCNC: 7.5 G/DL (ref 6.4–8.2)
RBC # BLD AUTO: 4.26 M/UL (ref 4.2–5.3)
SODIUM SERPL-SCNC: 138 MMOL/L (ref 136–145)
TRIGL SERPL-MCNC: 196 MG/DL
TSH SERPL DL<=0.05 MIU/L-ACNC: 1.88 UIU/ML (ref 0.36–3.74)
VLDLC SERPL CALC-MCNC: 39.2 MG/DL
WBC # BLD AUTO: 7.6 K/UL (ref 4.6–13.2)

## 2024-11-26 PROCEDURE — 87389 HIV-1 AG W/HIV-1&-2 AB AG IA: CPT

## 2024-11-26 PROCEDURE — 87491 CHLMYD TRACH DNA AMP PROBE: CPT

## 2024-11-26 PROCEDURE — 86803 HEPATITIS C AB TEST: CPT

## 2024-11-26 PROCEDURE — 87591 N.GONORRHOEAE DNA AMP PROB: CPT

## 2024-11-26 PROCEDURE — 36415 COLL VENOUS BLD VENIPUNCTURE: CPT

## 2024-11-26 PROCEDURE — 83036 HEMOGLOBIN GLYCOSYLATED A1C: CPT

## 2024-11-26 PROCEDURE — 84443 ASSAY THYROID STIM HORMONE: CPT

## 2024-11-26 PROCEDURE — 85025 COMPLETE CBC W/AUTO DIFF WBC: CPT

## 2024-11-26 PROCEDURE — 86706 HEP B SURFACE ANTIBODY: CPT

## 2024-11-26 PROCEDURE — 80053 COMPREHEN METABOLIC PANEL: CPT

## 2024-11-26 PROCEDURE — 80061 LIPID PANEL: CPT

## 2024-11-26 PROCEDURE — 86787 VARICELLA-ZOSTER ANTIBODY: CPT

## 2024-11-26 PROCEDURE — 82306 VITAMIN D 25 HYDROXY: CPT

## 2024-11-27 LAB
C TRACH RRNA SPEC QL NAA+PROBE: NEGATIVE
HBV SURFACE AB SER QL IA: NEGATIVE
HBV SURFACE AB SERPL IA-ACNC: 5.46 MIU/ML
HCV AB SER IA-ACNC: 0.03 INDEX
HCV AB SERPL QL IA: NEGATIVE
HEP BS AB COMMENT: ABNORMAL
HEPATITIS C COMMENT: NORMAL
HIV 1+2 AB+HIV1 P24 AG SERPL QL IA: NONREACTIVE
HIV 1/2 RESULT COMMENT: NORMAL
N GONORRHOEA RRNA SPEC QL NAA+PROBE: NEGATIVE
SPECIMEN SOURCE: NORMAL

## 2024-11-28 LAB — VZV IGG SER IA-ACNC: REACTIVE

## 2024-11-29 ENCOUNTER — TELEMEDICINE (OUTPATIENT)
Facility: CLINIC | Age: 24
End: 2024-11-29
Payer: OTHER GOVERNMENT

## 2024-11-29 DIAGNOSIS — F32.A ANXIETY AND DEPRESSION: ICD-10-CM

## 2024-11-29 DIAGNOSIS — F41.9 ANXIETY AND DEPRESSION: ICD-10-CM

## 2024-11-29 PROCEDURE — 99213 OFFICE O/P EST LOW 20 MIN: CPT

## 2024-11-29 RX ORDER — FLUOXETINE 10 MG/1
1 TABLET ORAL DAILY
Qty: 90 TABLET | Refills: 1 | Status: SHIPPED | OUTPATIENT
Start: 2024-11-29

## 2024-11-29 NOTE — PROGRESS NOTES
Priscila Carter (: 2000) is a 24 y.o. female, established  patient, here for evaluation of the following:      Subjective:     Primary historian: patient    Medication Refill       HPI  She presents for follow-up of depression and refill of fluoxetine for which she has been taking for several years.  States that depressive symptoms are well-controlled on current dosage.    No past medical history on file.     No past surgical history on file.     Current Outpatient Medications   Medication Sig Dispense Refill    FLUoxetine HCl, PMDD, 10 MG TABS Take 1 tablet by mouth daily 90 tablet 1    ondansetron (ZOFRAN-ODT) 4 MG disintegrating tablet ondansetron 4 mg disintegrating tablet  DISSOLVE 1 TABLET ON THE TONGUE EVERY 12 HOURS AS NEEDED 90 tablet 1    promethazine (PHENERGAN) 12.5 MG tablet Take 1 tablet by mouth every 6 hours as needed for Nausea 90 tablet 1    Rimegepant Sulfate 75 MG TBDP Take 75 mg by mouth daily as needed       No current facility-administered medications for this visit.       ROS:    Review of Systems   Constitutional:  Negative for chills, fatigue and fever.   HENT:  Negative for ear pain, hearing loss, sore throat and trouble swallowing.    Eyes: Negative.    Respiratory:  Negative for cough, chest tightness, shortness of breath and wheezing.    Cardiovascular:  Negative for chest pain, palpitations and leg swelling.   Gastrointestinal:  Negative for abdominal pain, blood in stool, constipation, diarrhea, nausea and vomiting.   Endocrine: Negative for polydipsia, polyphagia and polyuria.   Genitourinary:  Negative for flank pain, hematuria, pelvic pain, vaginal bleeding, vaginal discharge and vaginal pain.   Skin: Negative.    Neurological:  Negative for dizziness, syncope, weakness and numbness.   Psychiatric/Behavioral:  Negative for confusion, decreased concentration, self-injury, sleep disturbance and suicidal ideas. The patient is not nervous/anxious.           Objective:

## 2025-01-20 ENCOUNTER — OFFICE VISIT (OUTPATIENT)
Facility: CLINIC | Age: 25
End: 2025-01-20
Payer: OTHER GOVERNMENT

## 2025-01-20 VITALS
HEART RATE: 88 BPM | WEIGHT: 178 LBS | HEIGHT: 64 IN | OXYGEN SATURATION: 99 % | SYSTOLIC BLOOD PRESSURE: 133 MMHG | TEMPERATURE: 98.3 F | DIASTOLIC BLOOD PRESSURE: 87 MMHG | BODY MASS INDEX: 30.39 KG/M2

## 2025-01-20 DIAGNOSIS — K52.9 COLITIS: Primary | ICD-10-CM

## 2025-01-20 DIAGNOSIS — Z86.0100 HISTORY OF COLON POLYPS: ICD-10-CM

## 2025-01-20 PROCEDURE — 99212 OFFICE O/P EST SF 10 MIN: CPT

## 2025-01-20 SDOH — ECONOMIC STABILITY: FOOD INSECURITY: WITHIN THE PAST 12 MONTHS, THE FOOD YOU BOUGHT JUST DIDN'T LAST AND YOU DIDN'T HAVE MONEY TO GET MORE.: NEVER TRUE

## 2025-01-20 SDOH — ECONOMIC STABILITY: FOOD INSECURITY: WITHIN THE PAST 12 MONTHS, YOU WORRIED THAT YOUR FOOD WOULD RUN OUT BEFORE YOU GOT MONEY TO BUY MORE.: NEVER TRUE

## 2025-01-20 ASSESSMENT — PATIENT HEALTH QUESTIONNAIRE - PHQ9
6. FEELING BAD ABOUT YOURSELF - OR THAT YOU ARE A FAILURE OR HAVE LET YOURSELF OR YOUR FAMILY DOWN: NOT AT ALL
1. LITTLE INTEREST OR PLEASURE IN DOING THINGS: NOT AT ALL
SUM OF ALL RESPONSES TO PHQ QUESTIONS 1-9: 0
7. TROUBLE CONCENTRATING ON THINGS, SUCH AS READING THE NEWSPAPER OR WATCHING TELEVISION: NOT AT ALL
5. POOR APPETITE OR OVEREATING: NOT AT ALL
SUM OF ALL RESPONSES TO PHQ9 QUESTIONS 1 & 2: 0
10. IF YOU CHECKED OFF ANY PROBLEMS, HOW DIFFICULT HAVE THESE PROBLEMS MADE IT FOR YOU TO DO YOUR WORK, TAKE CARE OF THINGS AT HOME, OR GET ALONG WITH OTHER PEOPLE: NOT DIFFICULT AT ALL
SUM OF ALL RESPONSES TO PHQ QUESTIONS 1-9: 0
SUM OF ALL RESPONSES TO PHQ QUESTIONS 1-9: 0
9. THOUGHTS THAT YOU WOULD BE BETTER OFF DEAD, OR OF HURTING YOURSELF: NOT AT ALL
2. FEELING DOWN, DEPRESSED OR HOPELESS: NOT AT ALL
SUM OF ALL RESPONSES TO PHQ QUESTIONS 1-9: 0
3. TROUBLE FALLING OR STAYING ASLEEP: NOT AT ALL
8. MOVING OR SPEAKING SO SLOWLY THAT OTHER PEOPLE COULD HAVE NOTICED. OR THE OPPOSITE, BEING SO FIGETY OR RESTLESS THAT YOU HAVE BEEN MOVING AROUND A LOT MORE THAN USUAL: NOT AT ALL
4. FEELING TIRED OR HAVING LITTLE ENERGY: NOT AT ALL

## 2025-01-20 NOTE — PROGRESS NOTES
Patient ID: Priscila Carter is a 24 y.o. female established patient presents for the following:      Subjective:     Primary historian: patient    Rectal Bleeding and GI Problem       HPI     She states that she has had rectal bleeding for almost 1 week, denies any fevers however does feel fatigued and dizzy.    No past medical history on file.    No past surgical history on file.    Current Outpatient Medications   Medication Sig Dispense Refill    FLUoxetine HCl, PMDD, 10 MG TABS Take 1 tablet by mouth daily 90 tablet 1    ondansetron (ZOFRAN-ODT) 4 MG disintegrating tablet ondansetron 4 mg disintegrating tablet  DISSOLVE 1 TABLET ON THE TONGUE EVERY 12 HOURS AS NEEDED 90 tablet 1    promethazine (PHENERGAN) 12.5 MG tablet Take 1 tablet by mouth every 6 hours as needed for Nausea 90 tablet 1    Rimegepant Sulfate 75 MG TBDP Take 75 mg by mouth daily as needed       No current facility-administered medications for this visit.          ROS   Review of Systems   Constitutional:  Negative for chills, fatigue and fever.   HENT:  Negative for ear pain, hearing loss, sore throat and trouble swallowing.    Eyes: Negative.    Respiratory:  Negative for cough, chest tightness, shortness of breath and wheezing.    Cardiovascular:  Negative for chest pain, palpitations and leg swelling.   Gastrointestinal:  Positive for abdominal pain, blood in stool and nausea. Negative for constipation, diarrhea and vomiting.   Endocrine: Negative for polydipsia, polyphagia and polyuria.   Genitourinary:  Negative for flank pain, hematuria, pelvic pain, vaginal bleeding, vaginal discharge and vaginal pain.   Skin: Negative.    Neurological:  Positive for dizziness. Negative for syncope, weakness and numbness.   Psychiatric/Behavioral:  Negative for confusion, decreased concentration, self-injury, sleep disturbance and suicidal ideas. The patient is not nervous/anxious.            Objective:     Vitals:    01/20/25 1310   BP: 133/87   Site:

## 2025-01-20 NOTE — PROGRESS NOTES
Priscila Carter is a 24 y.o. year old female who presents today for   Chief Complaint   Patient presents with    Rectal Bleeding    GI Problem        \"Have you been to the ER, urgent care clinic since your last visit?  Hospitalized since your last visit?\"   NO     “Have you seen or consulted any other health care providers outside our system since your last visit?”   NO      “Have you had a pap smear?”    NO    No cervical cancer screening on file             - VINCENT Jeroniom  [facility]  Phone: [phone]  Fax: [phone]

## 2025-02-05 ASSESSMENT — ENCOUNTER SYMPTOMS
DIARRHEA: 0
BLOOD IN STOOL: 1
VOMITING: 0
CHEST TIGHTNESS: 0
WHEEZING: 0
SHORTNESS OF BREATH: 0
SORE THROAT: 0
COUGH: 0
EYES NEGATIVE: 1
ABDOMINAL PAIN: 1
CONSTIPATION: 0
NAUSEA: 1
TROUBLE SWALLOWING: 0

## 2025-02-05 ASSESSMENT — VISUAL ACUITY: OU: 1
